# Patient Record
Sex: FEMALE | Race: WHITE | Employment: OTHER | URBAN - METROPOLITAN AREA
[De-identification: names, ages, dates, MRNs, and addresses within clinical notes are randomized per-mention and may not be internally consistent; named-entity substitution may affect disease eponyms.]

---

## 2018-08-31 ENCOUNTER — APPOINTMENT (OUTPATIENT)
Dept: GENERAL RADIOLOGY | Age: 53
End: 2018-08-31
Payer: COMMERCIAL

## 2018-08-31 ENCOUNTER — HOSPITAL ENCOUNTER (EMERGENCY)
Age: 53
Discharge: HOME | End: 2018-08-31
Payer: COMMERCIAL

## 2018-08-31 VITALS
WEIGHT: 250 LBS | SYSTOLIC BLOOD PRESSURE: 150 MMHG | HEIGHT: 64 IN | RESPIRATION RATE: 18 BRPM | OXYGEN SATURATION: 100 % | DIASTOLIC BLOOD PRESSURE: 95 MMHG | HEART RATE: 91 BPM | BODY MASS INDEX: 42.68 KG/M2 | TEMPERATURE: 99.7 F

## 2018-08-31 DIAGNOSIS — S92.354A CLOSED NONDISPLACED FRACTURE OF FIFTH METATARSAL BONE OF RIGHT FOOT, INITIAL ENCOUNTER: ICD-10-CM

## 2018-08-31 DIAGNOSIS — S92.492A OTHER FRACTURE OF LEFT GREAT TOE, INITIAL ENCOUNTER FOR CLOSED FRACTURE: Primary | ICD-10-CM

## 2018-08-31 PROCEDURE — 73630 X-RAY EXAM OF FOOT: CPT

## 2018-08-31 PROCEDURE — 99283 EMERGENCY DEPT VISIT LOW MDM: CPT

## 2018-08-31 PROCEDURE — 73610 X-RAY EXAM OF ANKLE: CPT

## 2018-08-31 PROCEDURE — 6370000000 HC RX 637 (ALT 250 FOR IP): Performed by: PHYSICIAN ASSISTANT

## 2018-08-31 RX ORDER — HYDROCODONE BITARTRATE AND ACETAMINOPHEN 5; 325 MG/1; MG/1
1 TABLET ORAL EVERY 6 HOURS PRN
Qty: 20 TABLET | Refills: 0 | Status: SHIPPED | OUTPATIENT
Start: 2018-08-31 | End: 2018-09-07

## 2018-08-31 RX ORDER — HYDROCODONE BITARTRATE AND ACETAMINOPHEN 5; 325 MG/1; MG/1
1 TABLET ORAL ONCE
Status: COMPLETED | OUTPATIENT
Start: 2018-08-31 | End: 2018-08-31

## 2018-08-31 RX ORDER — IBUPROFEN 600 MG/1
600 TABLET ORAL ONCE
Status: COMPLETED | OUTPATIENT
Start: 2018-08-31 | End: 2018-08-31

## 2018-08-31 RX ORDER — IBUPROFEN 600 MG/1
600 TABLET ORAL EVERY 6 HOURS PRN
Qty: 30 TABLET | Refills: 0 | Status: SHIPPED | OUTPATIENT
Start: 2018-08-31

## 2018-08-31 RX ADMIN — IBUPROFEN 600 MG: 600 TABLET ORAL at 21:10

## 2018-08-31 RX ADMIN — HYDROCODONE BITARTRATE AND ACETAMINOPHEN 1 TABLET: 5; 325 TABLET ORAL at 21:10

## 2018-08-31 ASSESSMENT — PAIN SCALES - GENERAL
PAINLEVEL_OUTOF10: 7
PAINLEVEL_OUTOF10: 7

## 2018-08-31 ASSESSMENT — PAIN DESCRIPTION - PAIN TYPE: TYPE: ACUTE PAIN

## 2018-08-31 ASSESSMENT — PAIN SCALES - WONG BAKER: WONGBAKER_NUMERICALRESPONSE: 0

## 2018-08-31 NOTE — ED PROVIDER NOTES
**[facility]  [facility]  eMERGENCY dEPARTMENT eNCOUnter      Pt Name: Dayna Strong  MRN: [id number]  [place] 1965  Date of evaluation: 8/31/2018  Provider: Julio C Wu PA-C      Chief Complaint:    Chief Complaint   Patient presents with   Leti Wilkins Lars down front cement steps, around 2 pm, Has been able to walk on them. bilateral ankle swelling, left big toe is black and blue patient unable to move the toes left foot. States she hurt popping. Nursing Notes, Past Medical Hx, Past Surgical Hx, Social Hx, Allergies, and Family Hx were all reviewed and agreed with or any disagreements were addressed in the HPI.    HPI:  (Location, Duration, Timing, Severity, Quality, Assoc Sx, Context, Modifying factors)  This is a  47 y.o. female patient presenting with  and grandson. Patient states at approximately 2:30 PM this afternoon or approximately 5 hours ago she missed 2 steps as she was descending. This caused her to stumble and fall. She is subsequently resulted in injury with ecchymotic change and swelling involving left great toe, left ankle laterally as well as the lateral right foot and lateral right ankle. There is prior history of left ankle fracture in the remote past.  She has no injuries related to the incident today. This was a slip and fall mechanical    Past Medical/Surgical History:      Diagnosis Date    No history of procedure          Procedure Laterality Date    COLONOSCOPY  08/15/2016    THROAT SURGERY      polyps on vocal cords    TUBAL LIGATION         Medications:  Discharge Medication List as of 8/31/2018  9:19 PM            Review of Systems:  Review of Systems  Positives and Pertinent negatives as per HPI. Except as noted above in the ROS, problem specific ROS was completed and is negative. Physical Exam:  Physical Exam   Constitutional: She is oriented to person, place, and time.  She appears of the results of any tests, a time was given to answer questions, a plan was proposed and they agreed with plan. CLINICAL IMPRESSION:  1. Other fracture of left great toe, initial encounter for closed fracture    2. Closed nondisplaced fracture of fifth metatarsal bone of right foot, initial encounter        DISPOSITION Decision To Discharge 08/31/2018 09:15:26 PM      PATIENT REFERRED TO:  ARABELLA Hopkins vipul [phone]    Schedule an appointment as soon as possible for a visit in 4 days      David Priest MD  [address] [place] [address] Dr. Denis [address] [place] [address]  [phone]    Schedule an appointment as soon as possible for a visit   As needed    [facility]  ED  [address] [facility] [address]  Go to   If symptoms worsen      DISCHARGE MEDICATIONS:  Discharge Medication List as of 8/31/2018  9:19 PM      START taking these medications    Details   ibuprofen (ADVIL;MOTRIN) 600 MG tablet Take 1 tablet by mouth every 6 hours as needed for Pain, Disp-30 tablet, R-0Print      HYDROcodone-acetaminophen (NORCO) 5-325 MG per tablet Take 1 tablet by mouth every 6 hours as needed for Pain for up to 7 days. ., Disp-20 tablet, R-0Print             DISCONTINUED MEDICATIONS:  Discharge Medication List as of 8/31/2018  9:19 PM          Attestation: The Prescription Monitoring Report for this patient was reviewed today. Tiffanie Yang PA-C)  Documentation: Obtaining appropriate analgesic effect of treatment., No signs of potential drug abuse or diversion identified.  Tiffanie Yang PA-C)      (Please note the MDM and HPI sections of this note were completed with a voice recognition program.  Efforts were made to edit the dictations but occasionally words are mis-transcribed.)    Electronically signed, Jenn Stanton PA-C  09/01/18 1570

## 2018-09-06 ENCOUNTER — OFFICE VISIT (OUTPATIENT)
Dept: ORTHOPEDIC SURGERY | Age: 53
End: 2018-09-06

## 2018-09-06 ENCOUNTER — TELEPHONE (OUTPATIENT)
Dept: ORTHOPEDIC SURGERY | Age: 53
End: 2018-09-06

## 2018-09-06 VITALS
HEART RATE: 81 BPM | WEIGHT: 250 LBS | BODY MASS INDEX: 42.68 KG/M2 | SYSTOLIC BLOOD PRESSURE: 126 MMHG | HEIGHT: 64 IN | DIASTOLIC BLOOD PRESSURE: 85 MMHG

## 2018-09-06 DIAGNOSIS — S92.351A CLOSED DISPLACED FRACTURE OF FIFTH METATARSAL BONE OF RIGHT FOOT, INITIAL ENCOUNTER: ICD-10-CM

## 2018-09-06 DIAGNOSIS — S92.401A CLOSED FRACTURE OF PHALANX OF BOTH GREAT TOES, INITIAL ENCOUNTER: ICD-10-CM

## 2018-09-06 DIAGNOSIS — M79.671 BILATERAL FOOT PAIN: Primary | ICD-10-CM

## 2018-09-06 DIAGNOSIS — M79.672 BILATERAL FOOT PAIN: Primary | ICD-10-CM

## 2018-09-06 DIAGNOSIS — M25.371 ANKLE INSTABILITY, RIGHT: ICD-10-CM

## 2018-09-06 DIAGNOSIS — S92.402A CLOSED FRACTURE OF PHALANX OF BOTH GREAT TOES, INITIAL ENCOUNTER: ICD-10-CM

## 2018-09-06 PROCEDURE — L4361 PNEUMA/VAC WALK BOOT PRE OTS: HCPCS | Performed by: ORTHOPAEDIC SURGERY

## 2018-09-06 PROCEDURE — 28490 TREAT BIG TOE FRACTURE: CPT | Performed by: ORTHOPAEDIC SURGERY

## 2018-09-06 PROCEDURE — 99203 OFFICE O/P NEW LOW 30 MIN: CPT | Performed by: ORTHOPAEDIC SURGERY

## 2018-09-06 PROCEDURE — 28470 CLTX METATARSAL FX WO MNP EA: CPT | Performed by: ORTHOPAEDIC SURGERY

## 2018-09-06 NOTE — TELEPHONE ENCOUNTER
9/6/18  DME   - NOT COVERED - MUST BE [employer] ACCREDITED FOR ORTHOTIC APPLIANCES OR PROSTHETICS SUPPLIES - DME ONLY COVERED IF ITEM RECEIVED FROM PROSTHETICS OR ORTHOTIC SUPPLIER OR ACCREDITED SUPPLY COMPANY - NOT [facility]YS[facility] PRACTICE OR CLINIC - WE CAN PRESCRIBE BUT NOT DISPENSE -  PER NOTES - NDS

## 2018-09-07 PROBLEM — S92.402A CLOSED FRACTURE OF BOTH GREAT TOES: Status: ACTIVE | Noted: 2018-09-07

## 2018-09-07 PROBLEM — M25.371 ANKLE INSTABILITY, RIGHT: Status: ACTIVE | Noted: 2018-09-07

## 2018-09-07 PROBLEM — S92.351A CLOSED DISPLACED FRACTURE OF FIFTH METATARSAL BONE OF RIGHT FOOT: Status: ACTIVE | Noted: 2018-09-07

## 2018-09-07 PROBLEM — S92.401A CLOSED FRACTURE OF BOTH GREAT TOES: Status: ACTIVE | Noted: 2018-09-07

## 2018-09-07 NOTE — PROGRESS NOTES
Motion:  Decreased in the left great toe PIP joint and MTP joint full range of motion of the left ankle. She has decreased range of motion in the right ankle    Strength:  She is able to move her great toe up and down but is very uncomfortable. 3+ over 5 in ankle dorsiflexion plantarflexion no focal weakness    Special Tests: Both ankles show instability to anterior drawer and talar tilt sensation is intact bilaterally    Skin: There are no rashes, ulcerations or lesions. Gait: Antalgic    Reflex 2+ and symmetric    Additional Comments:       Additional Examinations:         Contralateral Exam: Both ankles and feet are involved    Radiology:     X-rays obtained and reviewed in office:  Views 3  Location both feet  Impression show evidence of the left great toe proximal phalanx intra-articular displaced fracture and on the right 5th metatarsal fracture minimally displaced midshaft avulsion injury noted in the distal fibula          Assessment : This patient has a right distal fibular avulsion injury 5th metatarsal fracture on the right and left great toe proximal phalanx intra-articular fracture    Impression:  Encounter Diagnoses   Name Primary?  Bilateral foot pain Yes    Closed fracture of phalanx of both great toes, initial encounter     Closed displaced fracture of fifth metatarsal bone of right foot, initial encounter     Ankle instability, right        Office Procedures:  Orders Placed This Encounter   Procedures    XR FOOT RIGHT (MIN 3 VIEWS)     Order Specific Question:   Reason for exam:     Answer:   pain    XR FOOT LEFT (MIN 3 VIEWS)     Order Specific Question:   Reason for exam:     Answer:   pain    MD CLOSED RX BIG TOE FRACTURE    MD CLOSED RX METATARSAL FX    Airselect Tall Pneumatic Walking Boot     Patient was prescribed a [employer]. The right foot will require stabilization / immobilization from this semi-rigid / rigid orthosis to improve their function.   The

## 2018-09-27 ENCOUNTER — OFFICE VISIT (OUTPATIENT)
Dept: ORTHOPEDIC SURGERY | Age: 53
End: 2018-09-27

## 2018-09-27 VITALS
WEIGHT: 250 LBS | HEART RATE: 86 BPM | BODY MASS INDEX: 42.68 KG/M2 | SYSTOLIC BLOOD PRESSURE: 112 MMHG | DIASTOLIC BLOOD PRESSURE: 78 MMHG | HEIGHT: 64 IN

## 2018-09-27 DIAGNOSIS — M25.371 ANKLE INSTABILITY, RIGHT: ICD-10-CM

## 2018-09-27 DIAGNOSIS — M79.671 FOOT PAIN, BILATERAL: Primary | ICD-10-CM

## 2018-09-27 DIAGNOSIS — S92.401D CLOSED FRACTURE OF BOTH GREAT TOES WITH ROUTINE HEALING, SUBSEQUENT ENCOUNTER: ICD-10-CM

## 2018-09-27 DIAGNOSIS — S92.402D CLOSED FRACTURE OF BOTH GREAT TOES WITH ROUTINE HEALING, SUBSEQUENT ENCOUNTER: ICD-10-CM

## 2018-09-27 DIAGNOSIS — M79.672 FOOT PAIN, BILATERAL: Primary | ICD-10-CM

## 2018-09-27 PROCEDURE — 99024 POSTOP FOLLOW-UP VISIT: CPT | Performed by: ORTHOPAEDIC SURGERY

## 2018-09-27 NOTE — PROGRESS NOTES
Subjective: Patient states that she is here for follow-up of her right distal fibula avulsion fracture 5th metatarsal fracture on the right and left great toe proximal phalanx intra-articular fracture. She states that her pain is minimal.  She feels like her left great toe is about 80% of normal and continues to improve. She has no pain on the right 5th metatarsal and only minimal discomfort over the right lateral ankle. She has had to walk on this more than she would like. Date of injury was 8/31/18  Objective: Physical exam shows left great toe swelling is decreased significantly. She has 10° of MTP flexion 30-40° of extension minimal discomfort no numbness or tingling flexion extensor tendons are intact. Right ankle shows mild swelling over the anterior lateral aspect of the ankle anterior drawer and talar tilt showed mild laxity strength is 4/5 in a dorsiflexion plantarflexion. No tenderness along the peroneal tendons. No tenderness at the base of the 5th metatarsal or along the metatarsal shaft at the fracture site. Flexion extensor tendons are intact   Imaging: 3 views of both feet 2 views of the right ankle show the right ankle distal fibular avulsion unchanged. The right 5th metatarsal shaft fracture is approximately 1 mm displaced farther than previous. I did show this to her. The left great toe is unchanged. Assessment and plan: I spent 20 minutes with this patient greater than 50% of the time face-to-face discussing treatment options. I'm going to have her continue to use the hard soled shoe on the left and boot on the right.   She can increase her weightbearing as tolerated and wean to a shoe as tolerated follow up with me in 2 weeks repeat x-rays of both feet and right ankle she may benefit from some physical therapy we also discussed the possibility of further displacement which would require surgery

## 2018-10-11 ENCOUNTER — OFFICE VISIT (OUTPATIENT)
Dept: ORTHOPEDIC SURGERY | Age: 53
End: 2018-10-11

## 2018-10-11 DIAGNOSIS — M79.671 FOOT PAIN, BILATERAL: Primary | ICD-10-CM

## 2018-10-11 DIAGNOSIS — M79.672 FOOT PAIN, BILATERAL: Primary | ICD-10-CM

## 2018-10-11 PROCEDURE — 99024 POSTOP FOLLOW-UP VISIT: CPT | Performed by: ORTHOPAEDIC SURGERY

## 2018-10-16 ENCOUNTER — HOSPITAL ENCOUNTER (OUTPATIENT)
Dept: PHYSICAL THERAPY | Age: 53
Setting detail: THERAPIES SERIES
Discharge: HOME | End: 2018-10-16
Payer: COMMERCIAL

## 2018-10-16 PROCEDURE — G8979 MOBILITY GOAL STATUS: HCPCS

## 2018-10-16 PROCEDURE — G8978 MOBILITY CURRENT STATUS: HCPCS

## 2018-10-16 PROCEDURE — 97110 THERAPEUTIC EXERCISES: CPT

## 2018-10-16 PROCEDURE — 97161 PT EVAL LOW COMPLEX 20 MIN: CPT

## 2018-10-16 PROCEDURE — 97112 NEUROMUSCULAR REEDUCATION: CPT

## 2018-10-23 ENCOUNTER — HOSPITAL ENCOUNTER (OUTPATIENT)
Dept: PHYSICAL THERAPY | Age: 53
Setting detail: THERAPIES SERIES
Discharge: HOME | End: 2018-10-23
Payer: COMMERCIAL

## 2018-10-23 PROCEDURE — 97140 MANUAL THERAPY 1/> REGIONS: CPT

## 2018-10-23 PROCEDURE — 97110 THERAPEUTIC EXERCISES: CPT

## 2018-10-23 PROCEDURE — 97112 NEUROMUSCULAR REEDUCATION: CPT

## 2018-10-30 ENCOUNTER — HOSPITAL ENCOUNTER (OUTPATIENT)
Dept: PHYSICAL THERAPY | Age: 53
Setting detail: THERAPIES SERIES
Discharge: HOME | End: 2018-10-30
Payer: COMMERCIAL

## 2018-10-30 PROCEDURE — 97112 NEUROMUSCULAR REEDUCATION: CPT

## 2018-10-30 PROCEDURE — 97140 MANUAL THERAPY 1/> REGIONS: CPT

## 2018-10-30 PROCEDURE — 97110 THERAPEUTIC EXERCISES: CPT

## 2018-10-30 NOTE — FLOWSHEET NOTE
[address] [place] [address] [facility]    Physical Therapy Daily Treatment Note  Date:  10/30/2018    Patient Name:  Shannan Kelly    :  1965  MRN: [id number]  Restrictions/Precautions:    Medical/Treatment Diagnosis Information:  Diagnosis: M79.671, M79.672 (ICD-10-CM) - Foot pain, bilateral  Treatment Diagnosis: M79.671 R foot pain; M79.672 L foot pain  Insurance/Certification information:  PT Insurance Information:  [employer]-$[id number]/OT-  Physician Information:  Referring Practitioner: Dr. Ramón Molina of care signed (Y/N): Y    Date of Patient follow up with Physician:  18    G-Code (if applicable):      Date G-Code Applied:         Progress Note: []  Yes  [x]  No  Next due by: Visit #10       Latex Allergy:  [x]NO      []YES  Preferred Language for Healthcare:   [x]English       []other:    Visit # Insurance Allowable   3 60PT/OT     Pain level:  0-2/10      SUBJECTIVE:  Pt reports compliance with HEP x1 per day. No issues, she feels less soreness than last visit. She has the most soreness in the morning, mostly related to her knees. OBJECTIVE: See eval  Observation: R ankle brace mirian OLIVAREZ  Test measurements:      RESTRICTIONS/PRECAUTIONS: WBAT    Exercises/Interventions:     Therapeutic Ex/NMR Ex Sets/sec Reps Notes HEP   4-way ankles 3 10ea BTB x   Towel Crunches 2 10  x   Runner's S  x    Standing HR/TR 3 10  x   Self massage plantar fascia    x   BAPS 1 10ea ??, ??, CW/CCW    Gastroc S w/ strap 30\" 2     Bike 5'  Resistance 3                                                     Pt Education: Pt educated on POC and HEP.   5'  Discussed appropriate gym activities and eventual transition to gym program.  -Pt demonstrated understanding    Manual Intervention       STM B plantar fascia, Post grd II-III TC glide, PROM R ankle/foot + gentle PROM L great toe 10'                                         NMR re-education       Balance: Tandem 20\" 5ea  x

## 2018-11-07 ENCOUNTER — OFFICE VISIT (OUTPATIENT)
Dept: ORTHOPEDIC SURGERY | Age: 53
End: 2018-11-07

## 2018-11-07 VITALS
SYSTOLIC BLOOD PRESSURE: 135 MMHG | HEART RATE: 84 BPM | DIASTOLIC BLOOD PRESSURE: 84 MMHG | HEIGHT: 64 IN | BODY MASS INDEX: 42.68 KG/M2 | WEIGHT: 250 LBS

## 2018-11-07 DIAGNOSIS — S92.402D CLOSED FRACTURE OF BOTH GREAT TOES WITH ROUTINE HEALING, SUBSEQUENT ENCOUNTER: ICD-10-CM

## 2018-11-07 DIAGNOSIS — S92.401D CLOSED FRACTURE OF BOTH GREAT TOES WITH ROUTINE HEALING, SUBSEQUENT ENCOUNTER: ICD-10-CM

## 2018-11-07 DIAGNOSIS — M79.671 FOOT PAIN, BILATERAL: Primary | ICD-10-CM

## 2018-11-07 DIAGNOSIS — S92.351D CLOSED DISPLACED FRACTURE OF FIFTH METATARSAL BONE OF RIGHT FOOT WITH ROUTINE HEALING, SUBSEQUENT ENCOUNTER: ICD-10-CM

## 2018-11-07 DIAGNOSIS — M79.672 FOOT PAIN, BILATERAL: Primary | ICD-10-CM

## 2018-11-07 PROCEDURE — 99024 POSTOP FOLLOW-UP VISIT: CPT | Performed by: ORTHOPAEDIC SURGERY

## 2020-02-06 ENCOUNTER — HOSPITAL ENCOUNTER (OUTPATIENT)
Dept: WOMENS IMAGING | Age: 55
Discharge: HOME | End: 2020-02-06
Payer: COMMERCIAL

## 2020-02-06 PROCEDURE — 77067 SCR MAMMO BI INCL CAD: CPT

## 2020-02-07 ENCOUNTER — TELEPHONE (OUTPATIENT)
Dept: WOMENS IMAGING | Age: 55
End: 2020-02-07

## 2022-08-08 ENCOUNTER — HOSPITAL ENCOUNTER (OUTPATIENT)
Dept: ULTRASOUND IMAGING | Age: 57
Discharge: HOME | End: 2022-08-08
Payer: COMMERCIAL

## 2022-08-08 ENCOUNTER — HOSPITAL ENCOUNTER (OUTPATIENT)
Dept: WOMENS IMAGING | Age: 57
Discharge: HOME | End: 2022-08-08
Payer: COMMERCIAL

## 2022-08-08 DIAGNOSIS — N95.1 SYMPTOMATIC MENOPAUSAL OR FEMALE CLIMACTERIC STATES: ICD-10-CM

## 2022-08-08 DIAGNOSIS — Z12.31 VISIT FOR SCREENING MAMMOGRAM: ICD-10-CM

## 2022-08-08 PROCEDURE — 77067 SCR MAMMO BI INCL CAD: CPT

## 2022-08-08 PROCEDURE — 76830 TRANSVAGINAL US NON-OB: CPT

## 2023-09-20 ENCOUNTER — HOSPITAL ENCOUNTER (OUTPATIENT)
Dept: WOMENS IMAGING | Age: 58
Discharge: HOME | End: 2023-09-20
Payer: COMMERCIAL

## 2023-09-20 VITALS — WEIGHT: 280 LBS | HEIGHT: 64 IN | BODY MASS INDEX: 47.8 KG/M2

## 2023-09-20 DIAGNOSIS — Z12.31 ENCOUNTER FOR SCREENING MAMMOGRAM FOR BREAST CANCER: ICD-10-CM

## 2023-09-20 PROCEDURE — 77063 BREAST TOMOSYNTHESIS BI: CPT

## 2024-05-02 ENCOUNTER — OFFICE VISIT (OUTPATIENT)
Dept: ORTHOPEDIC SURGERY | Age: 59
End: 2024-05-02
Payer: COMMERCIAL

## 2024-05-02 VITALS — HEIGHT: 63 IN | WEIGHT: 280 LBS | BODY MASS INDEX: 49.61 KG/M2

## 2024-05-02 DIAGNOSIS — M17.11 PRIMARY OSTEOARTHRITIS OF RIGHT KNEE: Primary | ICD-10-CM

## 2024-05-02 DIAGNOSIS — M25.561 RIGHT KNEE PAIN, UNSPECIFIED CHRONICITY: ICD-10-CM

## 2024-05-02 PROCEDURE — G8427 DOCREV CUR MEDS BY ELIG CLIN: HCPCS | Performed by: ORTHOPAEDIC SURGERY

## 2024-05-02 PROCEDURE — G8417 CALC BMI ABV UP PARAM F/U: HCPCS | Performed by: ORTHOPAEDIC SURGERY

## 2024-05-02 PROCEDURE — 1036F TOBACCO NON-USER: CPT | Performed by: ORTHOPAEDIC SURGERY

## 2024-05-02 PROCEDURE — 99204 OFFICE O/P NEW MOD 45 MIN: CPT | Performed by: ORTHOPAEDIC SURGERY

## 2024-05-02 PROCEDURE — 3017F COLORECTAL CA SCREEN DOC REV: CPT | Performed by: ORTHOPAEDIC SURGERY

## 2024-05-02 RX ORDER — MELOXICAM 15 MG/1
15 TABLET ORAL DAILY PRN
Qty: 30 TABLET | Refills: 0 | Status: SHIPPED | OUTPATIENT
Start: 2024-05-02

## 2024-05-02 NOTE — PROGRESS NOTES
ORTHOPAEDIC SURGERY H&P / CONSULTATION NOTE    Chief complaint:   Chief Complaint   Patient presents with    Knee Pain     NP R KNEE      History of present illness: The patient is a 59 y.o. female with subjective symptoms of right knee pain. The chief complaint is located at globally in the right knee but primarily medial and anterior based. Duration of symptoms has been for 5 years. The severity of symptoms is rated at 3/10 pain can be as high as 8/10 pain on intake form.  Patient states chronic right knee pain.  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  Dull throbbing aching pain but occasionally has sharp pain depending upon motion with.  She denies anti-inflammatory on a consistent basis.  She denies injections.  She denies therapy    The patient has tried the below listed items prior to today's consultation for above listed chief complaint.     -   Over-the-counter anti-inflammatories/prescription medication anti-inflammatory.     -   Physical therapy / guided home exercise program -     -   Previous corticosteroid injections    Past medical history:    Past Medical History:   Diagnosis Date    No history of procedure         Past surgical history:    Past Surgical History:   Procedure Laterality Date    COLONOSCOPY  08/15/2016    THROAT SURGERY      polyps on vocal cords    TUBAL LIGATION          Allergies:  No Known Allergies      Medications:   Current Outpatient Medications:     meloxicam (MOBIC) 15 MG tablet, Take 1 tablet by mouth daily as needed for Pain Take 1 tablet by mouth daily as needed for pain, Disp: 30 tablet, Rfl: 0    ibuprofen (ADVIL;MOTRIN) 600 MG tablet, Take 1 tablet by mouth every 6 hours as needed for Pain, Disp: 30 tablet, Rfl: 0     Social history: Denies IV drug use.    Social History     Socioeconomic History    Marital status:      Spouse name: Not on file    Number of children: Not on file    Years of education: Not on file    Highest

## 2024-05-06 ENCOUNTER — HOSPITAL ENCOUNTER (OUTPATIENT)
Dept: PHYSICAL THERAPY | Age: 59
Setting detail: THERAPIES SERIES
Discharge: HOME | End: 2024-05-06
Payer: COMMERCIAL

## 2024-05-06 DIAGNOSIS — M25.60 STIFFNESS DUE TO IMMOBILITY: ICD-10-CM

## 2024-05-06 DIAGNOSIS — M25.561 CHRONIC PAIN OF RIGHT KNEE: Primary | ICD-10-CM

## 2024-05-06 DIAGNOSIS — R53.1 WEAKNESS: ICD-10-CM

## 2024-05-06 DIAGNOSIS — G89.29 CHRONIC PAIN OF RIGHT KNEE: Primary | ICD-10-CM

## 2024-05-06 DIAGNOSIS — Z74.09 STIFFNESS DUE TO IMMOBILITY: ICD-10-CM

## 2024-05-06 PROCEDURE — 97161 PT EVAL LOW COMPLEX 20 MIN: CPT

## 2024-05-06 PROCEDURE — 97112 NEUROMUSCULAR REEDUCATION: CPT

## 2024-05-06 PROCEDURE — 97110 THERAPEUTIC EXERCISES: CPT

## 2024-05-06 NOTE — PLAN OF CARE
will demonstrate increased AROM of B knee to 0-120 without pain to allow for proper joint functioning to enable patient to sit comfortably.   [] Progressing: [] Met: [] Not Met: [] Adjusted  3. Patient will demonstrate increased Strength of B knee to at least 4+/5 throughout without pain to allow for proper functional mobility to enable patient to return to prolonged standing.   [] Progressing: [] Met: [] Not Met: [] Adjusted  4. Patient will return to proper hip stabilization without increased symptoms or restriction to enable patient to single leg balance during ambulation without knee valgus.   [] Progressing: [] Met: [] Not Met: [] Adjusted  5. Patient would like to return to walking up bleachers at "[employer] game for grandkids without pain (patient specific functional goal)    [] Progressing: [] Met: [] Not Met: [] Adjusted     Overall Progression Towards Functional goals/ Treatment Progress Update:  [] Patient is progressing as expected towards functional goals listed.    [] Progression is slowed due to complexities/Impairments listed.  [] Progression has been slowed due to co-morbidities.  [x] Plan just implemented, too soon (<30days) to assess goals progression   [] Goals require adjustment due to lack of progress  [] Patient is not progressing as expected and requires additional follow up with physician  [] Other:     TREATMENT PLAN     Frequency/Duration: 1-2x/week for 4-6 weeks for the following treatment interventions:    Interventions:  Therapeutic Exercise (52006) including: strength training, ROM, and functional mobility  Therapeutic Activities (87481) including: functional mobility training and education.  Neuromuscular Re-education (33311) activation and proprioception, including postural re-education.    Gait Training (58566) for normalization of ambulation patterns and AD training.   Manual Therapy (29910) as indicated to include: Passive Range of Motion, Gr I-IV mobilizations, Soft Tissue

## 2024-05-16 ENCOUNTER — HOSPITAL ENCOUNTER (OUTPATIENT)
Dept: PHYSICAL THERAPY | Age: 59
Setting detail: THERAPIES SERIES
Discharge: HOME | End: 2024-05-16
Payer: COMMERCIAL

## 2024-05-16 PROCEDURE — 97110 THERAPEUTIC EXERCISES: CPT | Performed by: PHYSICAL THERAPIST

## 2024-05-16 NOTE — FLOWSHEET NOTE
[facility]- Outpatient Rehabilitation and Therapy  [address], [place] [address] office: [phone] fax: [phone]         Physical Therapy: TREATMENT/PROGRESS NOTE   Patient: Wanda Marti (59 y.o. female)   Examination Date: 2024   :  1965 MRN: [id number]   Visit #: 2   Insurance Allowable Auth Needed   50v/year []Yes    [x]No    Insurance: Payor: [employer] / Plan: [employer] - CHOICE PLU / Product Type: *No Product type* /   Insurance ID: [id number] - (Commercial)  Secondary Insurance (if applicable):    Treatment Diagnosis:     ICD-10-CM    1. Chronic pain of right knee  M25.561     G89.29       2. Weakness  R53.1       3. Stiffness due to immobility  M25.60     Z74.09          Medical Diagnosis:  Primary osteoarthritis of right knee [M17.11]   Referring Physician: Abdiaziz Rosado MD  PCP: Harman Domínguez MD     Plan of care signed (Y/N): NO    Date of Patient follow up with Physician: Healthy weight f/u 24, no MD f/u scheduled until weight loss      Progress Report/POC: NO  POC update due: (10 visits /OR AUTH LIMITS, whichever is less)  2024                                             Precautions/ Contra-indications:           Latex allergy:  NO  Pacemaker:    NO  Contraindications for Manipulation: None  Date of Surgery: may consider once BMI is lowered per MD note  Other:    Red Flags:  None    C-SSRS Triggered by Intake questionnaire:   Patient answered 'NO' to both behavioral questions on intake.  No further screening warranted    Preferred Language for Healthcare:   [x] English       [] other:    SUBJECTIVE EXAMINATION     Patient stated complaint: Wanda reports her knee is feeling about 99% improved since it started bothering her.  The more she is on her feet, the more it bothers her. She reports she still has pain after being at the grocery store, but much less.  She would like to make this her last session    Patient stated